# Patient Record
Sex: FEMALE | Race: WHITE | NOT HISPANIC OR LATINO | URBAN - METROPOLITAN AREA
[De-identification: names, ages, dates, MRNs, and addresses within clinical notes are randomized per-mention and may not be internally consistent; named-entity substitution may affect disease eponyms.]

---

## 2017-01-12 ENCOUNTER — OFFICE VISIT - RIVER FALLS (OUTPATIENT)
Dept: FAMILY MEDICINE | Facility: CLINIC | Age: 22
End: 2017-01-12

## 2017-01-12 ASSESSMENT — MIFFLIN-ST. JEOR: SCORE: 1692.61

## 2017-03-09 ENCOUNTER — OFFICE VISIT - RIVER FALLS (OUTPATIENT)
Dept: FAMILY MEDICINE | Facility: CLINIC | Age: 22
End: 2017-03-09

## 2017-03-29 ENCOUNTER — OFFICE VISIT - RIVER FALLS (OUTPATIENT)
Dept: FAMILY MEDICINE | Facility: CLINIC | Age: 22
End: 2017-03-29

## 2017-06-22 ENCOUNTER — AMBULATORY - RIVER FALLS (OUTPATIENT)
Dept: FAMILY MEDICINE | Facility: CLINIC | Age: 22
End: 2017-06-22

## 2017-07-31 ENCOUNTER — OFFICE VISIT - RIVER FALLS (OUTPATIENT)
Dept: FAMILY MEDICINE | Facility: CLINIC | Age: 22
End: 2017-07-31

## 2017-07-31 ASSESSMENT — MIFFLIN-ST. JEOR: SCORE: 1633.64

## 2017-09-27 ENCOUNTER — AMBULATORY - RIVER FALLS (OUTPATIENT)
Dept: FAMILY MEDICINE | Facility: CLINIC | Age: 22
End: 2017-09-27

## 2017-12-26 ENCOUNTER — AMBULATORY - RIVER FALLS (OUTPATIENT)
Dept: FAMILY MEDICINE | Facility: CLINIC | Age: 22
End: 2017-12-26

## 2018-01-30 ENCOUNTER — OFFICE VISIT - RIVER FALLS (OUTPATIENT)
Dept: FAMILY MEDICINE | Facility: CLINIC | Age: 23
End: 2018-01-30

## 2018-01-30 ASSESSMENT — MIFFLIN-ST. JEOR: SCORE: 1638.52

## 2018-02-06 ENCOUNTER — AMBULATORY - RIVER FALLS (OUTPATIENT)
Dept: FAMILY MEDICINE | Facility: CLINIC | Age: 23
End: 2018-02-06

## 2018-08-10 ENCOUNTER — OFFICE VISIT - RIVER FALLS (OUTPATIENT)
Dept: FAMILY MEDICINE | Facility: CLINIC | Age: 23
End: 2018-08-10

## 2018-08-13 ENCOUNTER — AMBULATORY - RIVER FALLS (OUTPATIENT)
Dept: FAMILY MEDICINE | Facility: CLINIC | Age: 23
End: 2018-08-13

## 2019-11-27 ENCOUNTER — OFFICE VISIT - RIVER FALLS (OUTPATIENT)
Dept: FAMILY MEDICINE | Facility: CLINIC | Age: 24
End: 2019-11-27

## 2019-11-27 ASSESSMENT — MIFFLIN-ST. JEOR: SCORE: 1662.45

## 2019-11-28 LAB
BUN SERPL-MCNC: 16 MG/DL (ref 7–25)
BUN/CREAT RATIO - HISTORICAL: NORMAL (ref 6–22)
CALCIUM SERPL-MCNC: 9.5 MG/DL (ref 8.6–10.2)
CHLORIDE BLD-SCNC: 104 MMOL/L (ref 98–110)
CO2 SERPL-SCNC: 29 MMOL/L (ref 20–32)
CREAT SERPL-MCNC: 0.87 MG/DL (ref 0.5–1.1)
EGFRCR SERPLBLD CKD-EPI 2021: 92 ML/MIN/1.73M2
ERYTHROCYTE [DISTWIDTH] IN BLOOD BY AUTOMATED COUNT: 12.3 % (ref 11–15)
GLUCOSE BLD-MCNC: 87 MG/DL (ref 65–99)
HCT VFR BLD AUTO: 38.3 % (ref 35–45)
HGB BLD-MCNC: 12.8 GM/DL (ref 11.7–15.5)
MCH RBC QN AUTO: 28.7 PG (ref 27–33)
MCHC RBC AUTO-ENTMCNC: 33.4 GM/DL (ref 32–36)
MCV RBC AUTO: 85.9 FL (ref 80–100)
PLATELET # BLD AUTO: 298 10*3/UL (ref 140–400)
PMV BLD: 10.3 FL (ref 7.5–12.5)
POTASSIUM BLD-SCNC: 4.5 MMOL/L (ref 3.5–5.3)
RBC # BLD AUTO: 4.46 10*6/UL (ref 3.8–5.1)
SODIUM SERPL-SCNC: 138 MMOL/L (ref 135–146)
TSH SERPL DL<=0.005 MIU/L-ACNC: 1.31 MIU/L
WBC # BLD AUTO: 7.9 10*3/UL (ref 3.8–10.8)

## 2019-11-29 ENCOUNTER — COMMUNICATION - RIVER FALLS (OUTPATIENT)
Dept: FAMILY MEDICINE | Facility: CLINIC | Age: 24
End: 2019-11-29

## 2022-02-11 VITALS
TEMPERATURE: 99 F | HEIGHT: 68 IN | BODY MASS INDEX: 28.7 KG/M2 | DIASTOLIC BLOOD PRESSURE: 64 MMHG | SYSTOLIC BLOOD PRESSURE: 120 MMHG | HEART RATE: 70 BPM | WEIGHT: 189.4 LBS

## 2022-02-11 VITALS
WEIGHT: 202.4 LBS | HEART RATE: 66 BPM | OXYGEN SATURATION: 99 % | HEIGHT: 68 IN | TEMPERATURE: 97.9 F | SYSTOLIC BLOOD PRESSURE: 108 MMHG | BODY MASS INDEX: 30.68 KG/M2 | DIASTOLIC BLOOD PRESSURE: 70 MMHG

## 2022-02-11 VITALS
HEART RATE: 72 BPM | TEMPERATURE: 98.7 F | BODY MASS INDEX: 29.04 KG/M2 | DIASTOLIC BLOOD PRESSURE: 86 MMHG | SYSTOLIC BLOOD PRESSURE: 110 MMHG | WEIGHT: 191 LBS

## 2022-02-11 VITALS
HEIGHT: 68 IN | SYSTOLIC BLOOD PRESSURE: 112 MMHG | RESPIRATION RATE: 16 BRPM | HEART RATE: 60 BPM | WEIGHT: 194 LBS | TEMPERATURE: 97.1 F | BODY MASS INDEX: 29.4 KG/M2 | DIASTOLIC BLOOD PRESSURE: 64 MMHG

## 2022-02-11 VITALS
SYSTOLIC BLOOD PRESSURE: 118 MMHG | HEIGHT: 68 IN | BODY MASS INDEX: 28.73 KG/M2 | WEIGHT: 189.6 LBS | DIASTOLIC BLOOD PRESSURE: 64 MMHG | HEART RATE: 88 BPM | TEMPERATURE: 98.3 F

## 2022-02-16 NOTE — PROGRESS NOTES
Patient:   JOSH CESAR            MRN: [id number]            FIN: [id number]               Age:   22 years     Sex:  Female     :  1995   Associated Diagnoses:   Immunization due; Family planning, Depo-Provera contraception monitoring/administration   Author:   Meghan Pate      Chief Complaint   2017 6:50 PM CDT    c/o irregular menses        Interval History   Concerning symptoms as listed in Chief Complaint above discussed and confirmed with patient  She switched from Nexplanon to Depo in , continues to have daily spotting if not using oc's to suprress spotting.  Is sexually acitve, same partner, normal pap, no vaginal symptoms  Wants very reliable birth control  Not interested in IUD    Going to [place] and would like vaccine update         Health Status   Allergies:    Allergic Reactions (Selected)  No known allergies   Medications:  (Selected)   Prescriptions  Prescribed  Alesse 100 mcg-20 mcg oral tablet: 1 tab(s), PO, Daily, Instructions: take 21 days of active tablets then skip placebos and start the next pack--will need extra packs, # 84 tab(s), 1 Refill(s), Type: Maintenance, Pharmacy: [facility] [place], 1 tab(s) po daily,Instr:ta...  Depo-Provera Contraceptive 150 mg/mL intramuscular suspension: ( 150 mg ), im, once, # 1 mL, 0 Refill(s), Type: Soft Stop, other reason (Rx)   Problem list:    All Problems  Obesity / ICD-9-.00 / Probable  Resolved: Menarche / ICD-9-CM V21.8      Histories   Past Medical History:    Resolved  Menarche (V21.8): Onset in  at 11 years.  Resolved.   Family History:    Depression..  Brother  Factor 5 Leiden mutation, heterozygous  Sister     Procedure history:    Removal of etonogestrel implant (SNOMED CT 8904226169) on 2017 at 21 Years.  Comments:  2017 3:22 PM - Jessica Watts CMA  Nexplanon removal left arm  Nexplanon insertion performed by Meghan Pate on 3/10/2016 at 20 Years.  Comments:  3/31/2016 2:08 PM  - Mac RADERJessica  consent form signed with SUSAN    Lot: [id number]/ [id number]  Exp: 03/2018    due for removal on or by 03/10/2019   Social History:        Alcohol Assessment            Current, 1-2 times per week      Tobacco Assessment            Never      Substance Abuse Assessment            Never      Employment and Education Assessment            Employed, Work/School description: Teacher.      Nutrition and Health Assessment            Type of diet: Regular.      Exercise and Physical Activity Assessment            Exercise frequency: Never.      Sexual Assessment            Sexually active: Yes.  Sexual orientation: Heterosexual.  Contraceptive Use Details: Birth control implant,               Condoms.      Other Assessment            Last eye exam 9/18/12; Last dental exam 9/18/12        Physical Examination   Vital Signs   7/31/2017 6:50 PM CDT Temperature Tympanic 99 DegF    Peripheral Pulse Rate 70 bpm    Pulse Site Radial artery    HR Method Manual    Systolic Blood Pressure 120 mmHg    Diastolic Blood Pressure 64 mmHg    Mean Arterial Pressure 83 mmHg    BP Site Right arm    BP Method Manual      Measurements from flowsheet : Measurements   7/31/2017 6:50 PM CDT Height Measured - Standard 67.75 in    Weight Measured - Standard 189.4 lb    BSA 2.02 m2    Body Mass Index 29.01 kg/m2      General:  Alert and oriented, No acute distress.       Impression and Plan   Diagnosis     Immunization due (IUA65-IE Z23).     Family planning, Depo-Provera contraception monitoring/administration (WHN92-BG Z30.42).     Patient Instructions:       Counseled: Patient, Regarding diagnosis, Regarding treatment, Regarding medications, Verbalized understanding, 15 minutes spent with this pt, all on counseling regarding the use/risks/benefits of various birthcontrol methods including paragard/depo/oc'/nuvaring/evra  She is opting to stay on depo and use low dose oc to help stop bleeding as this has been effective. Reasses in 6  months.    Orders     Orders (Selected)   Prescriptions  Prescribed  Alesse 100 mcg-20 mcg oral tablet: 1 tab(s), PO, Daily, Instructions: take 21 days of active tablets then skip placebos and start the next pack--will need extra packs, # 84 tab(s), 1 Refill(s), Type: Maintenance, Pharmacy: [facility], 1 tab(s) po daily,Instr:ta....

## 2022-02-16 NOTE — LETTER
(Inserted Image. Unable to display)   November 29, 2019      JOSH CESAR      [address], [place] [id number]        Dear JOSH,    Thank you for selecting [facility] for your healthcare needs.  Below you will find the results of you recent tests done at our clinic.     All labs are acceptable.  No evidence of anemia and thyroid function (TSH) is normal.      Result Name Current Result Reference Range   Sodium Level (mmol/L)  138 11/27/2019 135 - 146   Potassium Level (mmol/L)  4.5 11/27/2019 3.5 - 5.3   Chloride Level (mmol/L)  104 11/27/2019 98 - 110   CO2 Level (mmol/L)  29 11/27/2019 20 - 32   Glucose Level (mg/dL)  87 11/27/2019 65 - 99   BUN (mg/dL)  16 11/27/2019 7 - 25   Creatinine Level (mg/dL)  0.87 11/27/2019 0.50 - 1.10   BUN/Creat Ratio  NOT APPLICABLE 11/27/2019 6 - 22   eGFR (mL/min/1.73m2)  92 11/27/2019 > OR = 60 -    Calcium Level (mg/dL)  9.5 11/27/2019 8.6 - 10.2   TSH (mIU/L)  1.31 11/27/2019    WBC  7.9 11/27/2019 3.8 - 10.8   RBC  4.46 11/27/2019 3.80 - 5.10   Hgb (gm/dL)  12.8 11/27/2019 11.7 - 15.5   Hct (%)  38.3 11/27/2019 35.0 - 45.0   MCV (fL)  85.9 11/27/2019 80.0 - 100.0   MCH (pg)  28.7 11/27/2019 27.0 - 33.0   MCHC (gm/dL)  33.4 11/27/2019 32.0 - 36.0   RDW (%)  12.3 11/27/2019 11.0 - 15.0   Platelet  298 11/27/2019 140 - 400   MPV (fL)  10.3 11/27/2019 7.5 - 12.5       Please contact me or my assistant at [phone] if you have any questions.     Sincerely,        Jalen Fernando PA-C    What do your labs mean?  Below is a glossary of commonly ordered labs:  LDL   Bad Cholesterol   HDL   Good Cholesterol  AST/ALT   Liver Function   Cr/Creatinine   Kidney Function  Microalbumin   Kidney Function  BUN   Kidney Function  PSA   Prostate    TSH   Thyroid Hormone  HgbA1c   Diabetes Test   Hgb (Hemoglobin)   Red Blood Cells

## 2022-02-16 NOTE — PROGRESS NOTES
Patient:   JOSH CESAR            MRN: [id number]            FIN: [id number]               Age:   22 years     Sex:  Female     :  1995   Associated Diagnoses:   Well adult; Family planning   Author:   Meghan Pate      Visit Information      Date of Service: 2018 08:49 am  Performing Location: [facility]  Encounter#: [id number]      Chief Complaint   2018 8:59 AM CST    Pt here today for annual px- would also like to discuss birth control.        Well Adult History   Well Adult History             The patient presents for well adult exam, just returned from travels abroad with boyfriend of many years  Still struggling with btb. Using depo 1 year and adding oc daily, which helps 80% of the time but willing to try no oc at this point for 2 weeks and see if spotting persists  due for pap  agreeable to flu shot, she is a .  The general health status is good.  The patient's diet is described as balanced.  Exercise: none.  Associated symptoms consist of weight gain.  Medical encounters:.        Review of Systems   Constitutional:  Negative except as documented in history of present illness.    Eye:  Negative except as documented in history of present illness.    Respiratory:  Negative except as documented in history of present illness.    Cardiovascular:  Negative except as documented in history of present illness.    Breast:  Negative.    Gastrointestinal:  Negative except as documented in history of present illness.    Genitourinary:  Negative except as documented in history of present illness.    Gynecologic:  Negative except as documented in history of present illness.    Hematology/Lymphatics:  Negative except as documented in history of present illness.    Endocrine:  Negative except as documented in history of present illness.    Immunologic:  Negative except as documented in history of present illness.    Musculoskeletal:  Negative except as  documented in history of present illness.    Integumentary:  Negative except as documented in history of present illness.    Neurologic:  Negative except as documented in history of present illness.    Psychiatric:  Negative except as documented in history of present illness.              Health Status   Allergies:    Allergic Reactions (Selected)  No known allergies   Medications:  (Selected)   Prescriptions  Prescribed  Alesse 100 mcg-20 mcg oral tablet: 1 tab(s), PO, Daily, Instructions: take 21 days of active tablets then skip placebos and start the next pack--will need extra packs, # 84 tab(s), 1 Refill(s), Type: Maintenance, Pharmacy: [facility], 1 tab(s) po daily,Instr:ta...  Depo-Provera Contraceptive 150 mg/mL intramuscular suspension: ( 150 mg ), im, once, # 1 mL, 0 Refill(s), Type: Soft Stop, other reason (Rx)  Loestrin Fe 1.5/30 oral tablet: 1 tab(s), po, daily, Instructions: Lulu will call before picking up, # 84 tab(s), 0 Refill(s), Type: Maintenance, Pharmacy: [facility], 1 tab(s) po daily,Instr:Lulu will call before picking up  Documented Medications  Documented  ibuprofen 800 mg oral tablet: 1 tab(s) ( 800 mg ), po, daily, PRN: for headache, 0 Refill(s), Type: Maintenance   Problem list:    All Problems  Obesity / ICD-9-.00 / Probable  Resolved: Menarche / ICD-9-CM V21.8      Histories   Past Medical History:    Resolved  Menarche (V21.8): Onset in 2006 at 11 years.  Resolved.   Family History:    Brother  Depression..  Sister  Factor 5 Leiden mutation, heterozygous     Procedure history:    Removal of etonogestrel implant (SNOMED CT 3787325208) on 1/12/2017 at 21 Years.  Comments:  7/31/2017 3:22 PM - Jessica Watts CMA  Nexplanon removal left arm  Nexplanon insertion performed by Meghan Pate on 3/10/2016 at 20 Years.  Comments:  3/31/2016 2:08 PM - Jessica Watts CMA  consent form signed with [facility]    Lot: [id number]  Exp:  03/2018    due for removal on or by 03/10/2019      Physical Examination   Vital Signs   1/30/2018 8:59 AM CST Temperature Tympanic 98.3 DegF    Peripheral Pulse Rate 88 bpm    Pulse Site Radial artery    HR Method Manual    Systolic Blood Pressure 118 mmHg    Diastolic Blood Pressure 64 mmHg    Mean Arterial Pressure 82 mmHg    BP Site Right arm    BP Method Manual      Measurements from flowsheet : Measurements   1/30/2018 8:59 AM CST Height Measured - Standard 68 in    Weight Measured - Standard 189.6 lb    BSA 2.03 m2    Body Mass Index 28.83 kg/m2  HI      General:  Alert and oriented, No acute distress, vital signs stable, as noted above.    Eye:  Pupils are equal, round and reactive to light, Extraocular movements are intact, Normal conjunctiva.    HENT:  Normocephalic, Tympanic membranes are clear, Normal hearing, Oral mucosa is moist, No pharyngeal erythema.    Neck:  Supple, Non-tender, No lymphadenopathy, No thyromegaly.    Respiratory:  Lungs are clear to auscultation, Respirations are non-labored, Breath sounds are equal, Symmetrical chest wall expansion.    Cardiovascular:  Normal rate, Regular rhythm, No murmur, No edema.    Breast:  No mass, No tenderness, No discharge, Breasts examined .  No infra nor supraclavicular nodes palpable.  No axillary nodes or masses palpable.  No nipple discharge. Breasts normal throughout.    Gastrointestinal:  Soft, Non-tender, Non-distended, No organomegaly.    Genitourinary:  Normal genitalia for age and sex, No inguinal tenderness, No urethral discharge, No lesions.         Perineum: Within normal limits.         Groin/ inguinal region: Within normal limits.         Urethra: Within normal limits.         Vagina: Within normal limits.         Labia: Within normal limits.         Cervix: Within normal limits.         Uterus: Within normal limits.         Adnexa: Within normal limits.    Lymphatics:  no axillary, no supra or infraclavicular nor inguinal lymphadenopathy  palpable.    Musculoskeletal:  Normal range of motion, Normal strength, No deformity, Normal gait.    Integumentary:  Warm, Dry, Pink, Intact, No rash.    Neurologic:  Alert, Oriented, Normal sensory, Normal motor function, Cranial Nerves II-XII are grossly intact.    Psychiatric:  Cooperative, Appropriate mood & affect, Normal judgment, PHQ 9/CAGE questionaire reviewed and discussed with patient,  see score.       Impression and Plan   Diagnosis     Well adult (VRJ24-IL Z00.00).     Family planning (XSY67-EN Z30.09).     Patient Instructions:       Counseled: Patient, Regarding diagnosis, Regarding medications, Verbalized understanding, counseled on health benefits of healthy weight, regular exercise, healthy diet, she is not interested in an IUd  she will try the next 2 months with no oc and if BTB subsides will continue depo without added oc.  If spotting persists she is going to stop depo and just use oc, will choose slightly higher estrogen dose and one more friendly for acne as she has struggled with cystic acne (but is not interested in this time at persuing any further treatment).    Orders     Orders (Selected)   Prescriptions  Prescribed  Loestrin Fe 1.5/30 oral tablet: 1 tab(s), po, daily, Instructions: Lulu will call before picking up, # 84 tab(s), 0 Refill(s), Type: Maintenance, Pharmacy: [facility] [place], 1 tab(s) po daily,Instr:Lulu will call before picking up.

## 2022-02-16 NOTE — NURSING NOTE
Depression Screening Entered On:  11/27/2019 10:52 AM CST    Performed On:  11/27/2019 10:51 AM CST by Chato Mcfadden CMA               Depression Screening   Little Interest - Pleasure in Activities :   Several days   Feeling Down, Depressed, Hopeless :   Not at all   Initial Depression Screen Score :   1    Trouble Falling or Staying Asleep :   Several days   Feeling Tired or Little Energy :   More than half the days   Poor Appetite or Overeating :   Several days   Feeling Bad About Yourself :   Not at all   Trouble Concentrating :   Several days   Moving or Speaking Slowly :   Not at all   Thoughts Better Off Dead or Hurting Self :   Not at all   Detailed Depression Screen Score :   5    Total Depression Screen Score :   6    JEAN Difficulty with Work, Home, Others :   Somewhat difficult   Chato Mcfadden CMA - 11/27/2019 10:51 AM CST

## 2022-02-16 NOTE — PROGRESS NOTES
Patient:   JOSH CESAR            MRN: [id number]            FIN: [id number]               Age:   24 years     Sex:  Female     :  1995   Associated Diagnoses:   Annual physical exam; Fatigue   Author:   Jalen Fernando PA-C      Visit Information   Visit type:  Annual exam.       Chief Complaint   2019 9:57 AM CST   Pt here for a Px.        Well Adult History   Well Adult History             The patient presents for well adult exam, needs physical for health insurance at work, no concerns, has some back pain, goes to chiropractor regularly which helps, has headaches about every other day, takes ibuprofen about once a week, she used to be on BCP but has stopped because she and her fiance are trying to have children.  The patient's general health status is described as good.  Preventive services:  BP:  112/64 today  Last PAP: normal 2018, repeat in    .  Exercise: routine.  Additional pertinent history: occasional caffeine use, tobacco use none and alcohol use socially.  Patient's PHQ9 CAGE questionnaire reviewed and discussed with patient.   .        Review of Systems   Constitutional:  Fatigue.    Ear/Nose/Mouth/Throat:  Nasal congestion.    Respiratory:  Cough.    Cardiovascular:  Negative.    Breast:  Negative.    Gastrointestinal:  Negative.    Genitourinary:  Negative.    Gynecologic:  Negative.    Musculoskeletal:  Back pain.    Neurologic:  Headache.    Psychiatric:  PHQ-9=  6.       Health Status   Allergies:    Allergic Reactions (Selected)  No Known Medication Allergies   Medications:  (Selected)   , not on any regular medications   Problem list:    All Problems  Obesity / SNOMED CT 3226836915 / Probable  Resolved: H/O: chickenpox / SNOMED CT 930604268  Resolved: Menarche / ICD-9-CM V21.8      Histories   Past Medical History:    Active  Obesity (8907342833)  Resolved  Menarche (V21.8): Onset in  at 11 years.  Resolved.  H/O: chickenpox (114893875):  Resolved.   Family  History:    Grandfather (M)  [place] - Cancer of prostate  Aunt (M)  Heart disease  Uncle (M)  Heart disease  Brother  Depression..  Father: Pierre    Depression  Mental illness  Sister  Factor 5 Leiden mutation, heterozygous     Procedure history:    Removal of etonogestrel implant (8280321153) on 1/12/2017 at 21 Years.  Comments:  7/31/2017 3:22 PM Jessica Zelaya CMA  Nexplanon removal left arm  Nexplanon insertion on 3/10/2016 at 20 Years.  Comments:  3/31/2016 2:08 PM Jessica Zelaya CMA  consent form signed with [facility]    Lot: [id number]/ [id number]  Exp: 03/2018    due for removal on or by 03/10/2019  Extraction of wisdom tooth ([id number]).   Social History:        Alcohol Assessment            Current, 0-1 time per week, 1 drinks/episode average.  5 drinks/episode maximum.      Tobacco Assessment            Never      Substance Abuse Assessment            Never      Employment and Education Assessment            Employed, Work/School description: Teacher.      Nutrition and Health Assessment            Type of diet: Regular.      Exercise and Physical Activity Assessment            Exercise frequency: Never.      Sexual Assessment            Sexually active: Yes.  Sexual orientation: Heterosexual.  Contraceptive Use Details: Birth control implant,               Condoms.        Gynecologic history      Physical Examination   Last Menstrual Period: 11/23/2019     Vital Signs   11/27/2019 9:57 AM CST Temperature Tympanic 97.1 DegF  LOW    Peripheral Pulse Rate 60 bpm    Pulse Site Radial artery    Respiratory Rate 16 br/min    Systolic Blood Pressure 112 mmHg    Diastolic Blood Pressure 64 mmHg    Mean Arterial Pressure 80 mmHg    BP Site Right arm      Measurements from flowsheet : Measurements   11/27/2019 9:57 AM CST Height Measured - Standard 68.25 in    Weight Measured - Standard 194 lb    BSA 2.06 m2    Body Mass Index 29.28 kg/m2  HI      General:  No acute distress.    Eye:  Pupils are equal, round and  reactive to light, Extraocular movements are intact.    HENT:  Tympanic membranes are clear, No sinus tenderness.    Neck:  Supple, Non-tender, No lymphadenopathy.    Respiratory:  Lungs are clear to auscultation.    Cardiovascular:  Normal rate, Regular rhythm, No murmur.    Gastrointestinal:  Soft, Non-tender, Non-distended, Normal bowel sounds, No organomegaly.    Genitourinary:  Exam deferred.    Musculoskeletal:  Normal range of motion.    Integumentary:  No rash.    Neurologic:  Cranial Nerves II-XII are grossly intact, Normal deep tendon reflexes.    Psychiatric:  Appropriate mood & affect.       Health Maintenance   Immunization schedule      Impression and Plan   Diagnosis     Annual physical exam (KHS73-JO Z00.00).     Fatigue (HLU20-AI R53.83).     Course:  Well controlled.    Summary:  because of the fatigue will check CBC, chem 8, TSH, encouraged working on good sleep hygiene, will give Adacel and flu vaccines today.    Orders     Orders   Lab (Gen Lab  Reference Lab):  TSH* ([facility]) (Order): Specimen Type: Serum, Collection Date: 11/27/2019 10:25 AM CST  CBC (h/h, RBC, indices, WBC, Plt)* ([facility]) (Order): Specimen Type: Blood, Collection Date: 11/27/2019 10:25 AM CST  Basic Metabolic Panel* ([facility]) (Order): Specimen Type: Serum, Collection Date: 11/27/2019 10:25 AM CST.     Orders   Charges (Evaluation and Management):  [id number] periodic preventive med est patient 18-39 yrs (Charge) (Order): Quantity: 1, Annual physical exam  Fatigue.

## 2022-02-16 NOTE — PROGRESS NOTES
Patient:   JOSH CESAR            MRN: [id number]            FIN: [id number]               Age:   23 years     Sex:  Female     :  1995   Associated Diagnoses:   Physical exam, pre-employment   Author:   Meghan Pate      Subjective   Chief complaint 8/10/2018 3:57 PM CDT    Employment physical and PPD  .     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 oral tablet: See Instructions, Instructions: TAKE ONE TABLET BY MOUTH EVERY DAY, # 84 tab(s), 4 Refill(s), Type: Soft Stop, Pharmacy: [facility], TAKE ONE TABLET BY MOUTH EVERY DAY  Documented Medications  Documented  ibuprofen 800 mg oral tablet: 1 tab(s) ( 800 mg ), po, daily, PRN: for headache, 0 Refill(s), Type: Maintenance   Problem list:    All Problems  Obesity / SNOMED CT 1247887298 / Probable  Resolved: Menarche / ICD-9-CM V21.8      Objective   Last Menstrual Period: 2018     Vital Signs   8/10/2018 3:57 PM CDT Temperature Tympanic 98.7 DegF    Peripheral Pulse Rate 72 bpm    Systolic Blood Pressure 110 mmHg    Diastolic Blood Pressure 86 mmHg  HI    Mean Arterial Pressure 94 mmHg    BP Site Right arm    BP Method Manual      Measurements from flowsheet : Measurements   8/10/2018 3:57 PM CDT Height/Length Estimated 68 in    Weight Measured - Standard 191 lb         Impression and Plan   Assessment and Plan:          Diagnosis: Physical exam, pre-employment (TYC76-UK Z02.1).         Course: no contraindications to employment pending mantoux read.

## 2022-02-16 NOTE — PROGRESS NOTES
Patient:   JOSH CESAR            MRN: [id number]            FIN: [id number]               Age:   21 years     Sex:  Female     :  1995   Associated Diagnoses:   Family planning, Depo-Provera contraception monitoring/administration; Nexplanon removal   Author:   Meghan Pate      Visit Information      Date of Service: 2017 05:38 pm  Performing Location: [facility]  Encounter#: [id number]      Primary Care Provider (PCP):  Meghan Pate    NPI# [id number]      Procedure   Procedure   Date/ Time:  2017 6:50:00 PM.     She is unhappy with Nexplanon, has used nearly 1 year, used oc's x3 months with control of bleeding but stopped them 6 weeks ago and has had 20 days of spotting since. Would like to switch to depo maybe. Has never used methods other than oc's and nexplanon, needs reliable method, sister uses depo.   Counseled on the use/risk/benefits depo.     Confirmed: patient.     Performed by: Meghan Pate.     Type of procedure: Implanon /Nexplanon removal.     Physical Exam: no relative contraindications found, vital signs Vital Signs   2017 5:42 PM CST Temperature Tympanic 97.9 DegF    Peripheral Pulse Rate 66 bpm    Pulse Site Radial artery    Systolic Blood Pressure 108 mmHg    Diastolic Blood Pressure 70 mmHg    Mean Arterial Pressure 83 mmHg    BP Site Right arm    Oxygen Saturation 99 %      .     Informed consent: signed by patient.     Indication: Pt presents to have Implanon/Nexplanon  implantable birth control device removed.  Reason for Removal: bleeding  Patient  about removal risks. No attempt to remove device will be made if the device is not palpable.  Confirmed no allergies to antiseptic and anesthetic.  Informed consent was given and procedure consent signed and witnessed. Pt was moved to the procedure room and place on cot supine with ___________left__ arm raised and resting above head.  Implanon/Nexplanon device was easily  palpable.   The end closest to the elbow was marked with a sterile marker.  The surrounding area was cleaned with Betadine swabs.  The area underneath the end of the implant closest to the elbow was injected with 0.5cc or 1%Lidocaine with epi, being careful to inject under the implant to prevent obscuring or displacing the nathan.  By pressing down on the end of the implant closest to the axilla, the tip closest to the elbow was visualized and a 2-3 mm incision in the longitudinal direction of the arm was made here, working toward the elbow..  By Gently pushing the implant toward the incision the tip was visible and then grasped with a sterile mosquito forceps and gently removed.  The entire nathan, was removed, measured to confirm it's 4 cm in length, and discarded.      A butterfly steri strip 1/4 inch wide was used to close the incision and a bandaid and pressure dressing were applied with 4x4 dressings and Coban.  The patient was instructed to keep the dressing in place for 24 hours then she may remove it and keep clean and dry and covered with a bandaid if needed.  The patient was instructed to watch for s/s of infection and to RTC if she has any concerns.    Future Birth Control: depo 150 mg given today    RTC 77-90 days Depo #2.     Procedure tolerated: well.     Specimen: disposed of, Implanon device discarded.        Impression and Plan   Diagnosis     Family planning, Depo-Provera contraception monitoring/administration ([id number] Z30.42).     Nexplanon removal ([id number] Z30.46).     Orders     Orders (Selected)   Prescriptions  Prescribed  Depo-Provera Contraceptive 150 mg/mL intramuscular suspension: ( 150 mg ), im, once, # 1 mL, 0 Refill(s), Type: Soft Stop, other reason (Rx).     10 min in counseling  .     Counseled:  Patient, Regarding diagnosis, Regarding treatment, as above.

## 2022-02-16 NOTE — NURSING NOTE
CAGE Assessment Entered On:  11/27/2019 10:51 AM CST    Performed On:  11/27/2019 10:51 AM CST by Chato Mcfadden CMA               Assessment   Have you ever felt you should cut down on your drinking :   No   Have people annoyed you by criticizing your drinking :   No   Have you ever felt bad or guilty about your drinking :   No   Have you ever taken a drink first thing in the morning to steady your nerves or get rid of a hangover (Eye-opener) :   No   CAGE Score :   0    Chato Mcfadden CMA - 11/27/2019 10:51 AM CST

## 2022-02-16 NOTE — PROCEDURES
Accession Number:       [id number]  CLINICAL INFORMATION::     None given  LMP::     1/4/18  PREV. PAP::     8/21/15-WNL  PREV. BX::     N/A  SOURCE::     Cervix  STATEMENT OF ADEQUACY::     Satisfactory for evaluation. Endocervical/transformation zone component present.  INTERPRETATION/RESULT::     Negative for intraepithelial lesion or malignancy.  COMMENT::     This Pap test has been evaluated with computer assisted technology.  CYTOTECHNOLOGIST::     DIYA CONDON(ASCP) CT Screening location: [place] [address]

## 2022-02-16 NOTE — NURSING NOTE
Comprehensive Intake Entered On:  11/27/2019 10:02 AM CST    Performed On:  11/27/2019 9:57 AM CST by Chato Mcfadden CMA               Summary   Chief Complaint :   Pt here for a Px.   Last Menstrual Period :   11/23/2019 CST   Menstrual Status :   Menarcheal   Weight Measured :   194 lb(Converted to: 194 lb 0 oz, 88.00 kg)    Height Measured :   68.25 in(Converted to: 5 ft 8 in, 173.35 cm)    Body Mass Index :   29.28 kg/m2 (HI)    Body Surface Area :   2.06 m2   Systolic Blood Pressure :   112 mmHg   Diastolic Blood Pressure :   64 mmHg   Mean Arterial Pressure :   80 mmHg   Peripheral Pulse Rate :   60 bpm   BP Site :   Right arm   Pulse Site :   Radial artery   Temperature Tympanic :   97.1 DegF(Converted to: 36.2 DegC)  (LOW)    Respiratory Rate :   16 br/min   Chato Mcfadden CMA - 11/27/2019 9:57 AM CST   Health Status   Allergies Verified? :   Yes   Medication History Verified? :   Yes   Medical History Verified? :   Yes   Pre-Visit Planning Status :   Not completed   Tobacco Use? :   Never smoker   Chato Mcfadden CMA - 11/27/2019 9:57 AM CST   Meds / Allergies   (As Of: 11/27/2019 10:02:53 AM CST)   Allergies (Active)   No Known Medication Allergies  Estimated Onset Date:   Unspecified ; Created By:   Elisa Norris CMA; Reaction Status:   Active ; Category:   Drug ; Substance:   No Known Medication Allergies ; Type:   Allergy ; Updated By:   Elisa Norris CMA; Reviewed Date:   11/27/2019 10:00 AM CST        Medication List   (As Of: 11/27/2019 10:02:53 AM CST)   Prescription/Discharge Order    ethinyl estradiol-norethindrone  :   ethinyl estradiol-norethindrone ; Status:   Processing ; Ordered As Mnemonic:   Zeb FE 1.5/30 oral tablet ; Ordering Provider:   Meghan Pate; Action Display:   Complete ; Catalog Code:   ethinyl estradiol-norethindrone ; Order Dt/Tm:   11/27/2019 10:00:09 AM CST            Home Meds    ibuprofen  :   ibuprofen ; Status:   Processing ; Ordered As Mnemonic:   ibuprofen 800  mg oral tablet ; Action Display:   Complete ; Catalog Code:   ibuprofen ; Order Dt/Tm:   11/27/2019 10:00:14 AM CST            Procedures / Surgeries        -    Procedure History   (As Of: 11/27/2019 10:02:53 AM CST)     Procedure Dt/Tm:   3/10/2016 ; Provider:   Meghan Pate; Anesthesia Minutes:   0 ; Procedure Name:   Nexplanon insertion ; Procedure Minutes:   0 ; Comments:     3/31/2016 2:08 PM GEETHA Watts CMAJessica  consent form signed with [facility]  Lot: [id number]/ [id number]  Exp: 03/2018  due for removal on or by 03/10/2019            Procedure Dt/Tm:   1/12/2017 ; Anesthesia Minutes:   0 ; Procedure Name:   Removal of etonogestrel implant ; Procedure Minutes:   0 ; Comments:     7/31/2017 3:22 PM GEETHA Watts CMA   Nexplanon removal left arm            Anesthesia Minutes:   0 ; Procedure Name:   Extraction of wisdom tooth ; Procedure Minutes:   0 ; Last Reviewed Dt/Tm:   11/27/2019 10:00:44 AM CST            Family History   Family History   (As Of: 11/27/2019 10:02:53 AM CST)   Aunt (M):   Relation:   Aunt (M) ;           Nomenclature:   Heart disease ; Value:   Positive          Uncle (M):   Relation:   Uncle (M) ;           Nomenclature:   Heart disease ; Value:   Positive          Brother:   Relation:   Brother ; Gender:   Male ;  YOB: 1995 12:00:00 AM CST            Nomenclature:   Depression.. ; Value:   Positive          Father: Pierre  Full Name:   Pierre ; Relation:   Father ; Gender:   Male ;  YOB: 1969 12:00:00 AM CST            Nomenclature:   Depression ; Value:   Positive            Nomenclature:   Mental illness ; Value:   Positive          Sister:   Relation:   Sister ; Gender:   Female ;  YOB: 1983 12:00:00 AM CST            Nomenclature:   Factor 5 Leiden mutation, heterozygous ; Value:   Positive            Social History   Social History   (As Of: 11/27/2019 10:02:53 AM CST)   Alcohol:        Current, 0-1 time per week, 1 drinks/episode  average.  5 drinks/episode maximum.   (Last Updated: 2/1/2018 8:55:27 AM CST by Jeni Escalona)          Tobacco:        Never   (Last Updated: 9/26/2012 2:42:38 PM CDT by Anitra Warren LPN)          Substance Abuse:        Never   (Last Updated: 9/26/2012 2:42:38 PM CDT by Anitra Warren LPN)          Employment/School:        Employed, Work/School description: Teacher.   (Last Updated: 9/15/2016 12:12:35 PM CDT by Jessica Watts CMA)          Nutrition/Health:        Type of diet: Regular.   (Last Updated: 8/21/2015 12:06:49 PM CDT by Frida Gong MA)          Exercise:        Exercise frequency: Never.   (Last Updated: 8/21/2015 12:07:22 PM CDT by Frida Gong MA)          Sexual:        Sexually active: Yes.  Sexual orientation: Heterosexual.  Contraceptive Use Details: Birth control implant, Condoms.   (Last Updated: 9/15/2016 12:13:05 PM CDT by Jessica Watts CMA)